# Patient Record
Sex: FEMALE | Race: WHITE
[De-identification: names, ages, dates, MRNs, and addresses within clinical notes are randomized per-mention and may not be internally consistent; named-entity substitution may affect disease eponyms.]

---

## 2017-05-26 ENCOUNTER — HOSPITAL ENCOUNTER (EMERGENCY)
Dept: HOSPITAL 62 - ER | Age: 32
LOS: 1 days | Discharge: HOME | End: 2017-05-27
Payer: SELF-PAY

## 2017-05-26 DIAGNOSIS — R07.81: ICD-10-CM

## 2017-05-26 DIAGNOSIS — R55: ICD-10-CM
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
## 2017-05-26 DIAGNOSIS — E04.1: ICD-10-CM

## 2017-05-26 DIAGNOSIS — M54.2: ICD-10-CM

## 2017-05-26 DIAGNOSIS — S00.83XA: Primary | ICD-10-CM

## 2017-05-26 DIAGNOSIS — F17.200: ICD-10-CM

## 2017-05-26 PROCEDURE — 72125 CT NECK SPINE W/O DYE: CPT

## 2017-05-26 PROCEDURE — 70450 CT HEAD/BRAIN W/O DYE: CPT

## 2017-05-26 PROCEDURE — 99284 EMERGENCY DEPT VISIT MOD MDM: CPT

## 2017-05-27 VITALS — SYSTOLIC BLOOD PRESSURE: 102 MMHG | DIASTOLIC BLOOD PRESSURE: 62 MMHG

## 2017-05-27 NOTE — RADIOLOGY REPORT (SQ)
EXAM DESCRIPTION:  CT CERVICAL SPINE WITHOUT



COMPLETED DATE/TIME:  5/27/2017 12:21 am



REASON FOR STUDY:  trauma



COMPARISON:  None.



TECHNIQUE:  Axial images acquired through the cervical spine without intravenous contrast.  Images re
viewed with lung, soft tissue and bone windows.  Reconstructed coronal and sagittal MPR images review
ed.  Images stored on PACS.

All CT scanners at this facility use dose modulation, iterative reconstruction, and/or weight based d
osing when appropriate to reduce radiation dose to as low as reasonably achievable (ALARA).

[facility]: Dose Right  [facility]: CareDose    [facility]: Dose Right    [facility]: Teradose 4D    [facility]: Smart Technologies



RADIATION DOSE:  13.25 mGy.



LIMITATIONS:  None.



FINDINGS:  ALIGNMENT: Anatomic.

MINERALIZATION: Normal.

VERTEBRAL BODIES: No fractures or dislocation.

DISCS: No significant disc disease.

FACETS, LATERAL MASSES, POSTERIOR ELEMENTS: No fractures.  No dislocation.  No acute findings.

HARDWARE: None in the spine.

VISUALIZED RIBS: No fractures.

LUNG APICES AND SOFT TISSUES: No significant or acute findings.  Incidental note is made of a right t
hyroid nodule demonstrating thin peripheral calcifications.

OTHER: No other significant finding.



IMPRESSION:  NO ACUTE OR SIGNIFICANT FINDINGS IN THE CERVICAL SPINE.  INCIDENTAL FINDING OF A POORLY 
CHARACTERIZED RIGHT THYROID LOBE NODULE WITH THIN, PERIPHERAL CALCIFICATIONS ; CONSIDER FURTHER EVALU
ATION WITH ROUTINE OUTPATIENT ULTRASOUND.



TECHNICAL DOCUMENTATION:  JOB ID:  [id number]

Quality ID # 436: Final reports with documentation of one or more dose reduction techniques (e.g., Au
tomated exposure control, adjustment of the mA and/or kV according to patient size, use of iterative 
reconstruction technique)

 2011 [employer]- All Rights Reserved

## 2017-05-27 NOTE — RADIOLOGY REPORT (SQ)
EXAM DESCRIPTION:  CT HEAD WITHOUT



COMPLETED DATE/TIME:  5/27/2017 12:21 am



REASON FOR STUDY:  trauma



COMPARISON:  None.



TECHNIQUE:  Axial images acquired through the brain without intravenous contrast.  Images reviewed wi
th bone, brain and subdural windows.  Images stored on PACS.

All CT scanners at this facility use dose modulation, iterative reconstruction, and/or weight based d
osing when appropriate to reduce radiation dose to as low as reasonably achievable (ALARA).

[facility]: Dose Right  [facility]: CareDose    [facility]: Dose Right    [facility]: Teradose 4D    [facility]: Smart Technologies



RADIATION DOSE:  64.61 mGy.



LIMITATIONS:  None.



FINDINGS:  VENTRICLES: Normal size and contour.

CEREBRUM: No masses.  No hemorrhage.  No midline shift.  Normal gray/white matter differentiation.  N
o evidence for acute infarction.

CEREBELLUM: No masses.  No hemorrhage.  No alteration of density.  No evidence for acute infarction.

EXTRAAXIAL SPACES: No fluid collections.  No masses.

ORBITS AND GLOBE: No intra- or extraconal masses.  Normal contour of globe without masses.

CALVARIUM: No fracture.

PARANASAL SINUSES: Scattered ethmoid air cell opacities.  No fluid or mucosal thickening.

SOFT TISSUES: Right frontal scalp thickening.

OTHER: No other significant finding.



IMPRESSION:  Right frontal scalp edema/ hematoma without underlying calvarial fracture or intracrania
l hemorrhage.



TECHNICAL DOCUMENTATION:  JOB ID:  [id number]

Quality ID # 436: Final reports with documentation of one or more dose reduction techniques (e.g., Au
tomated exposure control, adjustment of the mA and/or kV according to patient size, use of iterative 
reconstruction technique)

 2011 [employer]- All Rights Reserved

## 2017-05-27 NOTE — ER DOCUMENT REPORT
ED General





- General


Chief Complaint: Head Injury


Stated Complaint: HEMATOMA OBOVE RIGHT EYE


Time Seen by Provider: 05/26/17 23:59


Notes: 


Patient is a 32-year-old female presents with complaint of pain over the right 

temple area.  Patient said she had to be alcoholic drinks tonight and passed 

out and hit her head.  She does not remember the incident.  She has pain and 

swelling over the right temple area.  She also has some pain with extension of 

her neck.  She denies any other injuries or pain.  She denies history of 

bleeding disorders.  She has no other complaints at this time.





TRAVEL OUTSIDE OF THE [place] IN LAST 30 DAYS: No





- Related Data


Allergies/Adverse Reactions: 


 





amoxicillin [Amoxicillin] Allergy (Verified 05/16/16 18:23)


 


Penicillins Allergy (Verified 05/16/16 18:23)


 











Past Medical History





- Social History


Smoking Status: Current Every Day Smoker


Frequency of alcohol use: Occasional


Drug Abuse: None


Family History: Reviewed & Not Pertinent


Past Surgical History: Reports: Hx Tonsillectomy





- Immunizations


Hx Diphtheria, Pertussis, Tetanus Vaccination: Yes





Review of Systems





- Review of Systems


Notes: 


My Normal Review Basic





REVIEW OF SYSTEMS:


CONSTITUTIONAL :  Denies fever,  chills, or sweats.  Denies recent illness.


EENT:   Denies eye, ear, throat, or mouth pain or symptoms.  Denies nasal or 

sinus congestion.


CARDIOVASCULAR:  Denies chest pain.


RESPIRATORY:  Denies cough, cold, or chest congestion.  Denies shortness of 

breath, difficulty breathing, or wheezing.


GASTROINTESTINAL:  Denies abdominal pain.  Denies nausea, vomiting, or 

diarrhea.  Denies constipation.  Last BM: 


MUSCULOSKELETAL: Neck pain


SKIN:   Denies rash or skin lesions.


HEMATOLOGIC :   Denies easy bruising or bleeding.


NEUROLOGICAL: Had loss of consciousness.  Has a headache.  Denies weakness or 

paralysis or loss of use of either side.  Denies problems with gait or speech.  

Denies sensory or motor loss. 


ALL OTHER SYSTEMS REVIEWED AND NEGATIVE.





Physical Exam





- Vital signs


Vitals: 


 











Temp Pulse Resp BP Pulse Ox


 


 98.0 F   72   16   106/59 L  99 


 


 05/26/17 23:58  05/26/17 23:58  05/26/17 23:58  05/26/17 23:58  05/26/17 23:58














- Notes


Notes: 


General Appearance: Well nourished, alert, cooperative, no acute distress, 

moderate obvious discomfort.


Vitals: reviewed, See vital signs table.


Head: hematoma over the right temporal bone


Eyes: PERRL, EOMI, Conjuctiva clear


Mouth: No decreasd moisture


Throat: No tonsillar inflammation, No airway obstruction,  No lymphadenopathy


Neck: Supple, no neck tenderness, no neck tenderness to palpation; however, 

patient does have pain when she extends her neck.


Lungs: No wheezing, No rales, No rhonci, No accessory muscle use, good air 

exchange bilaterally.


Heart: Normal rate, Regular rythm, No murmur, no rub\


Chest wall: Pain to palpation over right lower ribs.  No pain to palpation of 

the abdomen underneath the ribs.


Extremities: strength 5/5 in all extremities, good pulses in all extremities, 

no swelling or tenderness in the extremities, no edema.


Skin: warm, dry, appropriate color, no rash


Neuro: speech clear, oriented x 3, normal affect, responds appropriately to 

questions.  Cranial nerves II through XII are intact.  Distal sensation intact.

  Patient moves all extremities without difficulty.





Course





- Vital Signs


Vital signs: 


 











Temp Pulse Resp BP Pulse Ox


 


 98.0 F   72   16   106/59 L  99 


 


 05/26/17 23:58  05/26/17 23:58  05/26/17 23:58  05/26/17 23:58  05/26/17 23:58














- Transfer of Care


Notes: 





05/27/17 02:08


Patient will be discharged home.  She has no evidence of acute intracranial 

abnormality or skull fracture on CT scan.  She will be sent home with a few 

Norco tablets for pain.  She did have a small thyroid nodule on CT scan.  I did 

inform her the importance of follow-up with for an outpatient ultrasound.  

Patient agrees.  Patient encouraged to return to ER if she has severe pain, 

difficulty breathing, or fevers.  Patient did have some pain of her right ribs.

  She had no pain on her abdomen.  No evidence of rib fracture on x-ray.





Dictation of this chart was performed using voice recognition software; 

therefore, there may be some unintended grammatical errors.





Discharge





- Discharge


Clinical Impression: 


 Rib pain on right side, Thyroid nodule





Syncope


Qualifiers:


 Syncope type: unspecified Qualified Code(s): R55 - Syncope and collapse





Facial contusion


Qualifiers:


 Encounter type: initial encounter Qualified Code(s): S00.83XA - Contusion of 

other part of head, initial encounter





Condition: Good


Disposition: HOME, SELF-CARE


Instructions:  Oral Narcotic Medication ([facility])


Additional Instructions: 


Your ultrasound showed a thyroid nodule. This is most likely benign, but there 

is always a small chance this could represent an underlying cancer therefore it 

is still very important you follow up with a doctor to arrange for a thyroid 

ultrasound within the next month. Your CT scan of the head is normal. Please 

return to the ER immediately if you develop vomiting, worsening headache or 

feel unwell. Please avoid an activities or sports that could cause potential 

trauma to your head for at least 2 weeks. Please be sure to take deep breaths 

every 30 minutes. Return to [facility] ER if you have difficulty breahting or fevers.

## 2017-05-27 NOTE — RADIOLOGY REPORT (SQ)
EXAM DESCRIPTION:  RIBS RIGHT W/PA CHEST



COMPLETED DATE/TIME:  5/27/2017 1:30 am



REASON FOR STUDY:  trauma



COMPARISON:  None.



TECHNIQUE:  Frontal view of the chest and additional views of the right ribs acquired.



NUMBER OF VIEWS:  3



LIMITATIONS:  None.



FINDINGS:  FRONTAL CXR: No pneumothorax.  No pleural effusion.  No atelectasis or infiltrates.

RIBS: No displaced rib fractures.  No lytic or blastic bony lesions.

OTHER: No other significant finding.



IMPRESSION:  NO PNEUMOTHORAX.  NO DISPLACED RIB FRACTURES.



COMMENT:  SITE OF TRAUMA/COMPLAINT MARKED/STAMP COMPLETED: No



TECHNICAL DOCUMENTATION:  JOB ID:  [id number]

 2011 [employer]- All Rights Reserved

## 2017-07-21 ENCOUNTER — HOSPITAL ENCOUNTER (EMERGENCY)
Dept: HOSPITAL 62 - ER | Age: 32
Discharge: HOME | End: 2017-07-21
Payer: SELF-PAY

## 2017-07-21 VITALS — DIASTOLIC BLOOD PRESSURE: 81 MMHG | SYSTOLIC BLOOD PRESSURE: 120 MMHG

## 2017-07-21 DIAGNOSIS — K02.9: Primary | ICD-10-CM

## 2017-07-21 DIAGNOSIS — Z88.0: ICD-10-CM

## 2017-07-21 DIAGNOSIS — F17.210: ICD-10-CM

## 2017-07-21 PROCEDURE — 99282 EMERGENCY DEPT VISIT SF MDM: CPT

## 2017-07-21 NOTE — ER DOCUMENT REPORT
ED Oral Problem





- General


Chief Complaint: Toothache


Stated Complaint: TOOTH PAIN


Time Seen by Provider: 07/21/17 21:18


Mode of Arrival: Ambulatory


Information source: Patient


Notes: 


32-year-old female presents to ED for dental pain to the right lower jaw.  She 

has 5 teeth from the wisdom teeth forward a letter very decayed with mild 

swelling to the jaw no swelling to the face.  States she has had this problem 

for multiple years and they have slowly become worse and father forward over 

the last couple years.  She states since she is allergic to penicillin they 

usually give her clindamycin and oxycodone for her dental pain.


TRAVEL OUTSIDE OF THE [place] IN LAST 30 DAYS: No





- HPI


Onset: Gradual


Quality of pain: Sharp, Throbbing


Severity: Severe


Pain Level: 5


Context: Other - 5 decayed teeth on the right lower jaw


Associated symptoms: Jaw pain, Toothache


Worsened by: Cold


Relieved by: Nothing


Similar symptoms previously: Yes


Recently seen / treated by doctor/dentist: No





- Related Data


Allergies/Adverse Reactions: 


 





amoxicillin [Amoxicillin] Allergy (Verified 07/21/17 17:49)


 


Penicillins Allergy (Verified 07/21/17 17:49)


 











Past Medical History





- General


Information source: Patient





- Social History


Smoking Status: Current Every Day Smoker


Cigarette use (# per day): Yes - 3 cigarettes a day


Chew tobacco use (# tins/day): No


Smoking Education Provided: Yes - Less than 2 minute


Frequency of alcohol use: Rare


Drug Abuse: None


Occupation: 


Lives with: Spouse/Significant other


Family History: Arthritis, CAD, COPD, CVA, DM, Hyperlipidemia, Hypertension, 

Malignancy.  denies: Thyroid Disfunction


Patient has suicidal ideation: No


Patient has homicidal ideation: No





- Past Medical History


Cardiac Medical History: Reports: None


Pulmonary Medical History: Reports: None


EENT Medical History: Reports: None


Neurological Medical History: Reports: None


Endocrine Medical History: Reports: None


Renal/ Medical History: Reports: None


Malignancy Medical History: Reports: None


GI Medical History: Reports: None


Musculoskeltal Medical History: Reports None


Skin Medical History: Reports None


Psychiatric Medical History: Reports: None


Traumatic Medical History: Reports: None


Infectious Medical History: Reports: None


Past Surgical History: Reports: Hx Tonsillectomy





- Immunizations


Hx Diphtheria, Pertussis, Tetanus Vaccination: Yes





Review of Systems





- Review of Systems


Constitutional: No symptoms reported


EENT: Mouth pain, Mouth swelling, Dental problem


Cardiovascular: No symptoms reported


Respiratory: No symptoms reported


Gastrointestinal: No symptoms reported


Genitourinary: No symptoms reported


Female Genitourinary: No symptoms reported


Musculoskeletal: No symptoms reported


Skin: No symptoms reported


Hematologic/Lymphatic: No symptoms reported


Neurological/Psychological: No symptoms reported





Physical Exam





- Vital signs


Vitals: 


 











Temp Pulse Resp BP Pulse Ox


 


 97.9 F   100   16   120/81   100 


 


 07/21/17 17:50  07/21/17 17:50  07/21/17 17:50  07/21/17 17:50  07/21/17 17:50











Interpretation: Normal





- General


General appearance: Appears well, Alert





- HEENT


Head: Normocephalic, Atraumatic


Eyes: Normal


Pupils: PERRL


Ears: Normal


External canal: Normal


Tympanic membrane: Normal


Sinus: Normal


Nasal: Normal


Mouth/Lips: Caries - 5 decayed teeth on the right lower jaw starting with a 

wisdom coming forward with mild redness to the gums


Mucous membranes: Normal


Pharynx: Normal


Neck: Normal





- Respiratory


Respiratory status: No respiratory distress


Chest status: Nontender


Breath sounds: Normal


Chest palpation: Normal





- Cardiovascular


Rhythm: Regular


Heart sounds: Normal auscultation


Murmur: No





- Abdominal


Inspection: Normal


Distension: No distension


Bowel sounds: Normal


Tenderness: Nontender


Organomegaly: No organomegaly





- Back


Back: Normal, Nontender





- Extremities


General upper extremity: Normal inspection, Nontender, Normal color, Normal ROM

, Normal temperature


General lower extremity: Normal inspection, Nontender, Normal color, Normal ROM

, Normal temperature, Normal weight bearing.  No: Sofía's sign





- Neurological


Neuro grossly intact: Yes


Cognition: Normal


Orientation: AAOx4


Marsha Coma Scale Eye Opening: Spontaneous


Marsha Coma Scale Verbal: Oriented


Marsha Coma Scale Motor: Obeys Commands


Marsha Coma Scale Total: 15


Speech: Normal


Motor strength normal: LUE, RUE, LLE, RLE


Sensory: Normal





- Psychological


Associated symptoms: Normal affect, Normal mood





- Skin


Skin Temperature: Warm


Skin Moisture: Dry


Skin Color: Normal





Course





- Re-evaluation


Re-evalutation: 





07/22/17 08:16


Patient became very angry and cursing and left her discharge instructions when 

she did not get narcotics for her dental pain that she has had off and on for 

several years.  She did take her dose of clindamycin in the emergency room and 

had the Tessalon Perles applied to the tooth.





- Vital Signs


Vital signs: 


 











Temp Pulse Resp BP Pulse Ox


 


 97.9 F   100   16   120/81   100 


 


 07/21/17 17:50  07/21/17 17:50  07/21/17 17:50  07/21/17 17:50  07/21/17 17:50














Discharge





- Discharge


Clinical Impression: 


 Pain due to dental caries





Disposition: HOME, SELF-CARE


Instructions:  Family Physicians / Practices, Dentist


Additional Instructions: 


TOOTHACHE:





     Your pain is due to dental decay.  The tooth must be repaired in order for 

you to feel better.  You will, therefore, be referred to a dentist.  We do not 

have dentists on the staff at [facility].


     Severe swelling or drainage around a tooth usually means a dental abscess.

  This also requires evaluation and treatment by the dentist, but antibiotics 

may be prescribed while awaiting dental treatment.


     You should be rechecked immediately if you develop major swelling of the 

face, increasing pain, a lump in the jaw or gums, headache, difficulty 

swallowing, or fever.








CLINDAMYCIN:


     You have been given a prescription for the antibiotic clindamycin.  It is 

often prescribed for infections in the mouth, such as dental infections or 

abscesses, and for skin infections due to MRSA.  It's important that you take 

all the medication, unless instructed otherwise by your physician.  Failure to 

complete the entire course can result in relapse of your condition.


     Common side effects of antibiotics include nausea, intestinal cramping, or 

diarrhea.  Women may develop vaginal yeast infections, and babies can get yeast 

(thrush) in the mouth following the use of antibiotics.  Contact your physician 

if you develop significant side effects from this medication.


     Allergy to this antibiotic can result in hives, wheezing, faintness, or 

itching.  If symptoms of allergy occur, stop the medication and call the doctor.





Chronic Pain Control





     Stress, inactivity, and depression make pain more severe regardless of the 

cause of the pain.  Stress and poor physical condition can cause pain such as 

headaches and backache.


     Relaxation:  Rest in a quiet place with your eyes closed for 20 minutes 

twice daily.  Concentrate on a pleasant image, or simply "feel" your breathing.

  Clear your mind.


     Stress management:  Deal with your "stressors."  Either take action, or 

eliminate the stressor from your life.  Don't let things hang over you.  Accept 

those things you can't change.


     Nutrition:  Eat small, balanced meals -- don't skip, don't overeat.  Meals 

should be high-carbohydrate, low-sugar, low-fat.


     Exercise:  Exercise helps painful conditions and eases stress. Get 30 

minutes of moderate exercise, five days a week. Do an activity that does not 

flare your pain.


     Precautions:  Pain which continues to disrupt daily activities, or which 

changes in nature, requires a medical evaluation.  Pain Clinic referral is 

available.





     


We do not manage chronic pain in the Emergency Department.  We will try to 

appropriately help you through an acute flare of your chronic painful condition

, but for on-going chronic pain that does not improve, you will need to see 

your private doctor or pain management specialist.  We do not provide repeated 

medication management of chronic painful conditions.  If you wish, we can 

provide the name of local pain management physicians.





FOLLOW-UP CARE:


     You have been referred for follow-up care to the dentists listed below.  

Call the dentists office for an appointment as you were instructed or within 

the next two days.  If you experience worsening or a significant change in your 

symptoms, notify the physician immediately or return to the Emergency 

Department at any time for re-evaluation.





[facility]


[address], [place]


[phone]


Friday mornings, by appointment





[facility]


[address] [place] [address] [phone]





[facility]


[address], [place] \[facility].\""


[phone]





[facility]


[address]


[phone]





[facility]


[address] [facility]


[phone]


[email or web address]





[facility]


[address] [place]  [address] [phone]


Monday-Thursdays  8:00am to 5:00 pm


Will see patients from other [facility].


Charges based on income and family size and


accepts Medicare, Medicaid, and Insurances


Will pull molars





[facility]


Student Clinics


[facility]. [address] [phone]


Hours of Operation


   8:00 am - 4:30 pm weekdays





The following dental offices accept Medicaid:





   [facility]   [phone]


   Dr. Hills         [phone]


   Dr. Ndiaye         [phone]


   Dr. Can         [phone]


   Dr. Barrientos         [phone]


   [facility]abe Burleson, Juan, and [facility]


      oral surgery      [phone]


   Dr. Fernandez ([place])      [phone]


   Dr. Coker ([place])   [phone]


   [facility]      [phone]


   Zahra Weinstein ([place])   [phone]


   Dr. Cook ([place])      [phone]


   [facility]      [phone]


   [facility]   [phone]


   [facility]   [phone]


   Dr. Grande ([place])      [phone]


   Zahra Lucia and 


      ([place])      [phone]





   Medicaid Care Line      [phone]


Prescriptions: 


Clindamycin HCl 300 mg PO Q6 10 Days


Forms:  Smoking Cessation Education

## 2018-10-24 ENCOUNTER — HOSPITAL ENCOUNTER (EMERGENCY)
Dept: HOSPITAL 62 - ER | Age: 33
Discharge: HOME | End: 2018-10-24
Payer: SELF-PAY

## 2018-10-24 VITALS — DIASTOLIC BLOOD PRESSURE: 89 MMHG | SYSTOLIC BLOOD PRESSURE: 120 MMHG

## 2018-10-24 DIAGNOSIS — S60.221A: ICD-10-CM

## 2018-10-24 DIAGNOSIS — S60.212A: ICD-10-CM

## 2018-10-24 DIAGNOSIS — F17.200: ICD-10-CM

## 2018-10-24 DIAGNOSIS — T74.21XA: Primary | ICD-10-CM

## 2018-10-24 DIAGNOSIS — F41.9: ICD-10-CM

## 2018-10-24 DIAGNOSIS — S60.211A: ICD-10-CM

## 2018-10-24 DIAGNOSIS — R51: ICD-10-CM

## 2018-10-24 DIAGNOSIS — S60.222A: ICD-10-CM

## 2018-10-24 LAB
APPEARANCE UR: (no result)
APTT PPP: YELLOW S
BARBITURATES UR QL SCN: NEGATIVE
BILIRUB UR QL STRIP: NEGATIVE
GLUCOSE UR STRIP-MCNC: NEGATIVE MG/DL
KETONES UR STRIP-MCNC: NEGATIVE MG/DL
METHADONE UR QL SCN: NEGATIVE
NITRITE UR QL STRIP: NEGATIVE
PCP UR QL SCN: NEGATIVE
PH UR STRIP: 5 [PH] (ref 5–9)
PROT UR STRIP-MCNC: NEGATIVE MG/DL
SP GR UR STRIP: 1.01
URINE AMPHETAMINES SCREEN: NEGATIVE
URINE BENZODIAZEPINES SCREEN: (no result)
URINE COCAINE SCREEN: NEGATIVE
URINE MARIJUANA (THC) SCREEN: (no result)
UROBILINOGEN UR-MCNC: NEGATIVE MG/DL (ref ?–2)

## 2018-10-24 PROCEDURE — 81001 URINALYSIS AUTO W/SCOPE: CPT

## 2018-10-24 PROCEDURE — 87086 URINE CULTURE/COLONY COUNT: CPT

## 2018-10-24 PROCEDURE — 87186 SC STD MICRODIL/AGAR DIL: CPT

## 2018-10-24 PROCEDURE — 87088 URINE BACTERIA CULTURE: CPT

## 2018-10-24 PROCEDURE — 96372 THER/PROPH/DIAG INJ SC/IM: CPT

## 2018-10-24 PROCEDURE — 99284 EMERGENCY DEPT VISIT MOD MDM: CPT

## 2018-10-24 PROCEDURE — 80307 DRUG TEST PRSMV CHEM ANLYZR: CPT

## 2018-10-24 PROCEDURE — S0119 ONDANSETRON 4 MG: HCPCS

## 2018-10-24 PROCEDURE — 81025 URINE PREGNANCY TEST: CPT
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
## 2020-06-11 ENCOUNTER — HOSPITAL ENCOUNTER (EMERGENCY)
Dept: HOSPITAL 62 - ER | Age: 35
Discharge: HOME | End: 2020-06-11
Payer: SELF-PAY

## 2020-06-11 VITALS — DIASTOLIC BLOOD PRESSURE: 73 MMHG | SYSTOLIC BLOOD PRESSURE: 113 MMHG

## 2020-06-11 DIAGNOSIS — F17.210: ICD-10-CM

## 2020-06-11 DIAGNOSIS — Z71.6: ICD-10-CM

## 2020-06-11 DIAGNOSIS — R20.0: Primary | ICD-10-CM

## 2020-06-11 PROCEDURE — 99406 BEHAV CHNG SMOKING 3-10 MIN: CPT

## 2020-06-11 PROCEDURE — 99283 EMERGENCY DEPT VISIT LOW MDM: CPT

## 2020-06-11 NOTE — ER DOCUMENT REPORT
HPI





- HPI


Patient complains to provider of: numbness to right foot


Time Seen by Provider: 06/11/20 12:20


Onset: This morning


Quality of pain: No pain


Severity: None


Pain Level: Denies


Context: 





35-year-old female presents to ED for complaint of numbness to the right foot.  

She states she is not having any pain anywhere.  She states she has no medical 

history of anything.  She states she does have a bad mattress and woke up with 

the numbness this morning.  She states the people at work scared her so she came

to the emergency room to get checked out.  She has no neurological deficits.


Associated Symptoms: Other


Exacerbated by: Denies - Next to the right foot


Relieved by: Denies


Similar symptoms previously: No


Recently seen / treated by doctor: No





- ROS


ROS below otherwise negative: Yes





- CONSTITUTIONAL


Constitutional: DENIES: Fever, Chills





- EENT


EENT: DENIES: Sore Throat, Ear Pain, Nasal Drainage-Clear, Nasal Drainage-

Purulent, Congestion, Eye problems





- NEURO


Neurology: DENIES: Headache, Weakness, Vision blurred, Dizzinesss / Vertigo





- CARDIOVASCULAR


Cardiovascular: DENIES: Chest pain





- RESPIRATORY


Respiratory: DENIES: Trouble Breathing, Coughing





- GASTROINTESTINAL


Gastrointestinal: DENIES: Abdominal Pain, Nausea, Patient vomiting, Diarrhea, 

Constipation, Black / Bloody Stools





- URINARY


Urinary: DENIES: Dysuria, Urgency, Frequency





- REPRODUCTIVE


Reproductive: DENIES: Pregnant:, Postmenopausal, Abnormal bleeding / discharge





- MUSCULOSKELETAL


Notes: 





Numbness to the right foot





- DERM


Skin Color: Normal


Skin Problems: None





Past Medical History





- General


Information source: Patient





- Social History


Smoking Status: Current Every Day Smoker


Cigarette use (# per day): Yes - 1/3 pack/day


Smoking Education Provided: Yes - 4 minutes


Frequency of alcohol use: None


Drug Abuse: None


Lives with: Family


Family History: Arthritis, CAD, COPD, CVA, DM, Hyperlipidemia, Hypertension, 

Malignancy


Patient has suicidal ideation: No


Patient has homicidal ideation: No





- Past Medical History


Cardiac Medical History: Reports: None


Pulmonary Medical History: Reports: None


EENT Medical History: Reports: None


Neurological Medical History: Reports: None


Endocrine Medical History: Reports: None


Renal/ Medical History: Reports: None


Malignancy Medical History: Reports: None


GI Medical History: Reports: None


Musculoskeletal Medical History: Reports None


Skin Medical History: Reports None


Psychiatric Medical History: Reports: None


Traumatic Medical History: Reports: None


Infectious Medical History: Reports: None


Past Surgical History: Reports: Hx Tonsillectomy





- Immunizations


Hx Diphtheria, Pertussis, Tetanus Vaccination: Yes





Vertical Provider Document





- CONSTITUTIONAL


Agree With Documented VS: Yes


Exam Limitations: No Limitations


General Appearance: WD/WN, No Apparent Distress





- INFECTION CONTROL


TRAVEL OUTSIDE OF THE [place] IN LAST 30 DAYS: No





- HEENT


HEENT: Atraumatic, Normal ENT Exam, Normocephalic, PERRLA





- NECK


Neck: Normal Inspection, Supple





- RESPIRATORY


Respiratory: Breath Sounds Normal, No Respiratory Distress, Chest Non-Tender





- CARDIOVASCULAR


Cardiovascular: Regular Rate, Regular Rhythm, No Murmur





- GI/ABDOMEN


Gastrointestinal: Abdomen Soft, Abdomen Non-Tender, No Organomegaly, Normal 

Bowel Sounds





- MUSCULOSKELETAL/EXTREMETIES


Musculoskeletal/Extremeties: MAEW, FROM, Non-Tender


Notes: 





She has some numbness to the foot when she woke up.  Has no pain no discomfort 

no change in strength of left and right leg no facial droop





- NEURO


Level of Consciousness: Awake


Motor/Sensory: No Motor Deficit, No Sensory Deficit, No Pronator Drift


Deep Tendon Reflexes: 3+





- DERM


Integumentary: Warm, Dry, No Rash





Course





- Vital Signs


Vital signs: 


                                        











Temp Pulse Resp BP Pulse Ox


 


 98.7 F   90   16   113/73   97 


 


 06/11/20 12:16  06/11/20 12:16  06/11/20 12:16  06/11/20 12:16  06/11/20 12:16














Discharge





- Discharge


Clinical Impression: 


 Numbness of right foot





Condition: Stable


Disposition: HOME, SELF-CARE


Additional Instructions: 


Numbness or Paresthesia





Definition:  Numbness and tingling are decreased or abnormal sensations caused 

by altered sensory nerve function.


Description:  The feeling of having a foot "fall asleep" is a familiar one. This

same combination of numbness and tingling can occur in any region of the body 

and may be caused by a wide variety of disorders. Sensations such as these, 

which occur without any associated stimulus, are called paresthesias. Other 

types of paresthesias include feelings of cold, warmth, burning, itching, and 

skin crawling.


Causes:  Sensation is carried to the brain by neurons (nerve cells) running from

the outer parts of the body to the spinal cord in bundles called nerves. In the 

spinal cord, these neurons make connections with other neurons that run up to 

the brain. Paresthesias are caused by disturbances in the function of neurons in

the sensory pathway. This disturbance can occur in the central nervous system 

(the brain and spinal cord), the nerve roots that are attached to the spinal 

cord, or the peripheral nervous system (nerves outside the brain and spinal 

cord).


Peripheral disturbances are the most common cause of paresthesias. "Falling 

asleep" occurs when the blood supply to a nerve is cut off-a condition called 

ischemia. Ischemia usually occurs when an artery is compressed as it passes 

through a tightly flexed joint. Sleeping with the arms above the head or sitting

with the legs tightly crossed frequently cause numbness and tingling.


Direct compression of the nerve also causes paresthesias. Compression can be 

short-lived, as when a heavy backpack compresses the nerves passing across the 

shoulders. Compression may also be chronic. Chronic nerve compression occurs in 

entrapment syndromes. The most common example is carpal tunnel syndrome. Carpal 

tunnel syndrome occurs when the median nerve is compressed as it passes through 

a narrow channel in the wrist. Repetitive motion or prolonged vibration can 

cause the lining of the channel to swell and press on the nerve. Chronic nerve 

root compression, or radiculopathy, can occur in disk disease or spinal 

arthritis.


Other causes of paresthesias related to disorders of the peripheral nerves 

include:


* Metabolic or nutritional disturbances. These disturbances include diabetes, 

  hypothyroidism (a condition caused by too little activity of the thyroid 

  gland), alcoholism, malnutrition, and vitamin B12 deficiency.


Trauma. Trauma includes injuries that crush, sever, or pull on nerves.


* Inflammation.


* Connective tissue disease. These diseases include arthritis, systemic lupus 

  erythematosus (a chronic inflammatory disease that affects many systems of the

  body, including the nervous system), polyarteritis nodosa (a vascular disease 

  that causes widespread inflammation and ischemia of small and medium-size 

  arteries), and Sj&ouml;gren's syndrome (a disorder marked by insufficient 

  moisture in the tear ducts, salivary glands, and other glands).


* Toxins. Toxins include heavy metals (metallic elements such as arsenic, lead, 

  and mercury which can, in large amounts, cause poisoning), certain antibiotics

  and chemotherapy agents, solvents, and overdose of pyridoxine (vitamin B6).


* Malignancy.


* Infections. Infections include Lyme disease, human immunodeficiency virus 

  (HIV), and leprosy.


* Hereditary disease. These diseases include Charcot-Juliet-Tooth disease (a here

  ditary disorder that causes wasting of the leg muscles, resulting in 

  malformation of the foot), porphyria (a group of inherited disorders in which 

  there is abnormally increased production of substances called porphyrins), and

  Weston-Brown's syndrome (a hereditary disorder of the nerve root).


Paresthesias can also be caused by central nervous system disturbances, 

including stroke, TIA (transient ischemic attack), tumor, trauma, multiple 

sclerosis, or infection.


Symptoms:  Sensory nerves supply or innervate particular regions of the body. 

Determining the distribution of symptoms is an important way to identify the 

nerves involved. For instance, the median nerve innervates the thumb, the first 

two fingers, half of the ring finger, and the part of the hand to which they 

connect. The ulnar nerve innervates the other half of the ring finger, the 

little finger, and the remainder of the hand. Distribution of symptoms may also 

aid diagnosis of the underlying disease. Diabetes usually causes a symmetrical 

"glove and stocking" distribution in the hands and feet. Multiple sclerosis may 

cause symptoms in several, widely  areas.


Other symptoms may accompany paresthesias, depending on the type and severity of

the nerve disturbance. For instance, weakness may accompany damage to nerves 

that carry both sensory and motor neurons. (Motor neurons are those that carry 

messages outward from the brain.)


Diagnosis:  A careful history of the patient is needed for a diagnosis of 

paresthesias. The medical history should focus on the onset, duration, and 

location of symptoms. The history may also reveal current related medical 

problems and recent or past exposure to drugs, toxins, infection, or trauma. The

family medical history may suggest a familial disorder. A work history may 

reveal repetitive motion, chronic vibration, or industrial chemical exposure.


The physical and neurological examination tests for distribution of symptoms and

alterations in reflexes, sensation, or strength. The distribution of symptoms 

may be mapped by successive stimulation over the affected area of the body.


Lab tests for paresthesia may include blood tests and urinalysis to detect 

metabolic or nutritional abnormalities. Other tests are used to look for 

specific suspected causes. Nerve conduction velocity tests, electromyography, 

and imaging studies of the affected area may be employed. Nerve biopsy may be 

indicated in selected cases.


Treatment:  Treatment of paresthesias depends on the underlying cause. For limbs

that have "fallen asleep," restoring circulation by stretching, exercising, or 

massaging the affected limb can quickly dissipate the numbness and tingling. If 

the paresthesia is caused by a chronic disease such as diabetes or occurs as a 

complication of treatments such as chemotherapy, most treatments are aimed at 

relieving symptoms. Anti-inflammatory drugs such as aspirin or ibuprofen are 

recommended if symptoms are mild. In more difficult cases, antidepressant drugs 

such as amitriptyline (Elavil) are sometimes prescribed. These drugs are given 

at a much lower dosage for this purpose than for relief of depression. They are 

thought to help because they alter the body's perception of pain. In severe 

cases, opium derivatives such as codeine can be prescribed. Currently trials are

being done to determine whether treatment with human nerve growth factor will be

effective in regenerating the damaged nerves.


Alternative treatment:  Several alternative treatments are available to help 

relieve symptoms of paresthesia. Nutritional therapy includes supplementation 

with B complex vitamins, especially vitamin B 12 (intramuscular injection of 

vitamin B12 is most effective). Vitamin supplements should be used cautiously 

however. Overdose of Vitamin B6 is one of the causes of paresthesias. People 

experiencing paresthesia should also avoid alcohol. Acupuncture and massage are 

said to relieve symptoms. Self-massage with aromatic oils is sometimes helpful. 

The application of topical ointments containing capsaicin, the substance that 

makes hot peppers hot, provides relief for some. It may also be helpful to wear 

loosely fitting shoes and clothing. None of these alternatives should be used in

place of traditional therapy for the underlying condition.


Prognosis:  Treating the underlying disorder may reduce the occurrence of 

paresthesias. Paresthesias resulting from damaged nerves may persist throughout 

or even beyond the recovery period. The overall prognosis depends on the cause.


Prevention:  Preventing the underlying disorder may reduce the incidence of 

paresthesias. For those with frequent paresthesias caused by ischemia, changes 

in posture may help.





Acetaminophen





     Acetaminophen may be taken for pain relief or fever control. It's much 

safer than aspirin, offering a wider range of "safe" dosages.  It is safe during

pregnancy.  Some brand names are Tylenol, Panadol, Datril, Anacin 3, Tempra, and

Liquiprin. Acetaminophen can be repeated every four hours.  The following are 

maximum recommended dosages:





WEIGHT         Dose             Drops                  Elixir        

Chewable(80mg)


(LBS.)                            drprs=droppers    tsp=teaspoon


6                 40 mg            .4 ml (1/2)


6-11            80 mg            .8 ml (full)            1/2   tsp           1  

    tab


12-16         120 mg           1 1/2 drprs            3/4   tsp           1 1/2 

tabs


17-23         160 mg             2  drprs              1      tsp           2   

   tabs


24-30         240 mg             3  drprs              1 1/2 tsp           3    

  tabs


30-35         320 mg                                     2       tsp           4

      tabs


36-41         360 mg                                     2 1/4 tsp           4 

1/2  tabs


42-47         400 mg                                     2 1/2 tsp           5  

    tabs


48-53         480 mg                                     3       tsp          6 

     tabs


54-59         520 mg                                     3  1/4 tsp          6 

1/2 tabs


60-64         560 mg                                     3  1/2 tsp          7  

   tabs 


65-70         600 mg                                     3  3/4 tsp          7 

1/2 tabs


71-76         640 mg                                     4       tsp           8

     tabs


77-82         720 mg                                     4 1/2  tsp           9 

    tabs


83-88         800 mg                                     5       tsp           

10     tabs





>89 pounds or adults          650 mg to 900 mg 





Acetaminophen can be repeated every four hours. Maximum daily dose not to exceed

4000 mg.





   These maximum recommended dosages are slightly higher than the dosages 

written on the product container, but these dosages are very safe and well below

the toxic dosage for acetaminophen.








Ibuprofen





     Ibuprofen is an excellent, safe drug for pain control.  In addition, it has

potent antiinflammatory effects which are beneficial, especially in the 

treatment of injuries, arthritis, or tendonitis. It's best to take ibuprofen 

with food.  Persons with ulcer disease or allergy to aspirin should notify their

physician of this before taking ibuprofen.


     Take the medication exactly as prescribed.  Don't take additional doses 

unless instructed to do so by your doctor.  If you develop wheezing, shortness 

of breath, hives, faintness, stomach pain, vomiting, or dark black stools, 

return for re-evaluation at once.





FOLLOW-UP CARE:


If you have been referred to a physician for follow-up care, call the 

physicians office for an appointment as you were instructed or within the next 

two days.  If you experience worsening or a significant change in your symptoms,

notify the physician immediately or return to the Emergency Department at any 

time for re-evaluation.


Forms:  Smoking Cessation Education, Return to Work


Referrals: 


[facility] [Provider Group] - Follow up as needed


[facility] [Provider Group] - Follow up as needed


[facility] [Provider Group] - Follow up as needed


JARED SALAS MD [ACTIVE STAFF] - Follow up as needed


CAROLINE TAO MD [ACTIVE STAFF] - Follow up as needed


KAMARI EDMOND MD [NO LOCAL MD] - Follow up as needed